# Patient Record
Sex: FEMALE | Race: BLACK OR AFRICAN AMERICAN | URBAN - METROPOLITAN AREA
[De-identification: names, ages, dates, MRNs, and addresses within clinical notes are randomized per-mention and may not be internally consistent; named-entity substitution may affect disease eponyms.]

---

## 2017-03-28 NOTE — PROGRESS NOTES
"   SUBJECTIVE:     CC: Mariaa Jacobson is an 21 year old woman who presents for preventive health visit.     Healthy Habits:    Do you get at least three servings of calcium containing foods daily (dairy, green leafy vegetables, etc.)? yes    Amount of exercise or daily activities, outside of work:   trys to-   Walks etc    Problems taking medications regularly not applicable    Medication side effects: No    Have you had an eye exam in the past two years? no    Do you see a dentist twice per year? Once per year    Do you have sleep apnea, excessive snoring or daytime drowsiness?no    Patient is requesting nexplanon removal.  She is planning to start a family.      She has a lesion above her upper lip that comes and goes, but never really goes away.  She states it will bleed at times.  It does not appear to act like a cold sore.       Chief Complaint   Patient presents with     Physical     Health Maintenance     height, weight, GC/Chlamydia, hpv, tetanus       Initial /78  Pulse 82  Ht 4' 11\" (1.499 m)  Wt 136 lb (61.7 kg)  BMI 27.47 kg/m2 Estimated body mass index is 27.47 kg/(m^2) as calculated from the following:    Height as of this encounter: 4' 11\" (1.499 m).    Weight as of this encounter: 136 lb (61.7 kg).  Medication Reconciliation: complete      Today's PHQ-2 Score:   PHQ-2 ( 1999 [employer]) 4/7/2017 2/10/2016   Q1: Little interest or pleasure in doing things 0 0   Q2: Feeling down, depressed or hopeless 0 0   PHQ-2 Score 0 0       Abuse: Current or Past(Physical, Sexual or Emotional)- No  Do you feel safe in your environment - Yes    Social History   Substance Use Topics     Smoking status: Never Smoker     Smokeless tobacco: Not on file     Alcohol use 0.0 oz/week     0 Standard drinks or equivalent per week      Comment: occasional     The patient does not drink >3 drinks per day nor >7 drinks per week.    No results for input(s): CHOL, HDL, LDL, TRIG, CHOLHDLRATIO, NHDL in the last 34053 " "hours.      Mammo Decision Support:  Mammogram not appropriate for this patient based on age.      Last 3 Pap Results:   PAP (no units)   Date Value   08/24/2015 NIL       ROS:  C: NEGATIVE for fever, chills, change in weight  I: NEGATIVE for worrisome rashes, moles or lesions  E: NEGATIVE for vision changes or irritation  ENT: NEGATIVE for ear, mouth and throat problems  R: NEGATIVE for significant cough or SOB  B: NEGATIVE for masses, tenderness or discharge  CV: NEGATIVE for chest pain, palpitations or peripheral edema  GI: NEGATIVE for nausea, abdominal pain, heartburn, or change in bowel habits  : NEGATIVE for unusual urinary or vaginal symptoms.  M: NEGATIVE for significant arthralgias or myalgia  N: NEGATIVE for weakness, dizziness or paresthesias  P: NEGATIVE for changes in mood or affect    BP Readings from Last 3 Encounters:   04/07/17 129/78   02/10/16 116/78   08/24/15 124/85    Wt Readings from Last 3 Encounters:   04/07/17 136 lb (61.7 kg)   02/10/16 133 lb 9.6 oz (60.6 kg)   08/24/15 132 lb 3.2 oz (60 kg)                  Patient Active Problem List   Diagnosis     CARDIOVASCULAR SCREENING; LDL GOAL LESS THAN 160     Past Surgical History:   Procedure Laterality Date     NO HISTORY OF SURGERY         Social History   Substance Use Topics     Smoking status: Never Smoker     Smokeless tobacco: Not on file     Alcohol use 0.0 oz/week     0 Standard drinks or equivalent per week      Comment: occasional     Family History   Problem Relation Age of Onset     DIABETES No family hx of      Hypertension No family hx of          OBJECTIVE:     /78  Pulse 82  Ht 4' 11\" (1.499 m)  Wt 136 lb (61.7 kg)  BMI 27.47 kg/m2  EXAM:    Gen: Alert and oriented times 3, no acute distress.  Well developed, well nourished, pleasant.    Neck: Supple, no masses.  No thyromegaly.  Breast: Symmetrical without lesions.  No dimpling, nipple discharge, or discrete masses.  No lymphadenopathy.  Chest:  Non labored.  " "Clear to auscultation bilaterally.    Heart: Regular, normal S1, S2.  No murmurs.   Abdomen: Soft, nontender, nondistended.  No hepatosplenomegaly.    :  Normal female external genitalia.  No lesions.  Urethral meatus normal.  Speculum exam reveals a normal vaginal vault, normal cervix.  No abnormal discharge.  Bimanual exam reveals a normal, mobile, nontender uterus.  No cervical motion tenderness.  Adnexa nontender with no palpable masses.    Extremities:  Nontender, no edema.    Pap obtained:  NO    ASSESSMENT/PLAN:         ICD-10-CM    1. Well female exam with routine gynecological exam Z01.419    2. Lip lesion K13.0 DERMATOLOGY REFERRAL       COUNSELING:   Reviewed preventive health counseling, as reflected in patient instructions       Family planning    She will schedule an appointment for Nexplanon removal at her convenience.      reports that she has never smoked. She does not have any smokeless tobacco history on file.    Estimated body mass index is 27.47 kg/(m^2) as calculated from the following:    Height as of this encounter: 4' 11\" (1.499 m).    Weight as of this encounter: 136 lb (61.7 kg).       Counseling Resources:  ATP IV Guidelines  Pooled Cohorts Equation Calculator  Breast Cancer Risk Calculator  FRAX Risk Assessment  ICSI Preventive Guidelines  Dietary Guidelines for Americans, 2010  USDA's MyPlate  ASA Prophylaxis  Lung CA Screening    Eda Costa MD  [facility]  "

## 2017-04-07 ENCOUNTER — OFFICE VISIT (OUTPATIENT)
Dept: OBGYN | Facility: CLINIC | Age: 22
End: 2017-04-07
Payer: COMMERCIAL

## 2017-04-07 VITALS
HEIGHT: 59 IN | HEART RATE: 82 BPM | DIASTOLIC BLOOD PRESSURE: 78 MMHG | BODY MASS INDEX: 27.42 KG/M2 | SYSTOLIC BLOOD PRESSURE: 129 MMHG | WEIGHT: 136 LBS

## 2017-04-07 DIAGNOSIS — K13.0 LIP LESION: ICD-10-CM

## 2017-04-07 DIAGNOSIS — Z01.419 WELL FEMALE EXAM WITH ROUTINE GYNECOLOGICAL EXAM: Primary | ICD-10-CM

## 2017-04-07 PROCEDURE — 99385 PREV VISIT NEW AGE 18-39: CPT | Performed by: OBSTETRICS & GYNECOLOGY

## 2017-04-07 ASSESSMENT — PAIN SCALES - GENERAL: PAINLEVEL: NO PAIN (0)

## 2017-04-14 ENCOUNTER — OFFICE VISIT (OUTPATIENT)
Dept: OBGYN | Facility: CLINIC | Age: 22
End: 2017-04-14
Payer: COMMERCIAL

## 2017-04-14 VITALS
BODY MASS INDEX: 28.07 KG/M2 | WEIGHT: 139 LBS | HEART RATE: 73 BPM | TEMPERATURE: 98.2 F | DIASTOLIC BLOOD PRESSURE: 71 MMHG | RESPIRATION RATE: 14 BRPM | OXYGEN SATURATION: 98 % | SYSTOLIC BLOOD PRESSURE: 115 MMHG

## 2017-04-14 DIAGNOSIS — Z30.46 NEXPLANON REMOVAL: Primary | ICD-10-CM

## 2017-04-14 DIAGNOSIS — Z30.011 ENCOUNTER FOR INITIAL PRESCRIPTION OF CONTRACEPTIVE PILLS: ICD-10-CM

## 2017-04-14 PROCEDURE — 11982 REMOVE DRUG IMPLANT DEVICE: CPT | Performed by: OBSTETRICS & GYNECOLOGY

## 2017-04-14 RX ORDER — NORGESTIMATE AND ETHINYL ESTRADIOL 0.25-0.035
1 KIT ORAL DAILY
Qty: 84 TABLET | Refills: 3 | Status: SHIPPED | OUTPATIENT
Start: 2017-04-14

## 2017-04-14 ASSESSMENT — PAIN SCALES - GENERAL: PAINLEVEL: NO PAIN (0)

## 2017-04-14 NOTE — PROGRESS NOTES
Mariaa Jacobson is a 21 year old female .  She was here today for Nexplanon removal. This was her second implant and she is planning pregnancy soon.  Patient medical, surgical, social, and family history reviewed and updated. ROS: 6 point ROS neg other than the symptoms noted above in the HPI.    She understands that she may become pregnant anytime after the Nexplanon is removed unless she uses another form of contraception.  She would like OCPs for now.  She will use back up contraception for the first month.  Instructions and precautions given.      Mariaa was counseled about removal. She voiced her understanding and informed consent was obtained.    /71 (BP Location: Left arm, Patient Position: Chair, Cuff Size: Adult Regular)  Pulse 73  Temp 98.2  F (36.8  C) (Oral)  Resp 14  Wt 139 lb (63 kg)  SpO2 98%  BMI 28.07 kg/m2   This is a well appearing female in no acute distress. Answers questions and maintains eye contact appropriately.       PROCEDURE NOTE:  After informed and written consent was obtained and a patient time-out conducted, the patient was situated on the exam table in supine position with her left arm rotated over her head and resting on the exam table. Nexplanon site was prepped with betadine and injected with 1% lidocaine with epi. After adequate anesthetization, a #11 blade was used to carry out a 3 mm incision near the top of the Implanon. A sterile curved hemostat was used to remove the Nexplanon. Steri strips and bandage were then applied. Patient tolerated the procedure well.

## 2017-04-14 NOTE — MR AVS SNAPSHOT
"              After Visit Summary   2017    Mariaa Jacobson    MRN: [id number]           Patient Information     Date Of Birth          1995        Visit Information        Provider Department      2017 9:00 AM Eda Costa MD [facility]        Today's Diagnoses     Nexplanon removal    -  1    Encounter for initial prescription of contraceptive pills           Follow-ups after your visit        Follow-up notes from your care team     Return if symptoms worsen or fail to improve.      Who to contact     If you have questions or need follow up information about today's clinic visit or your schedule please contact [facility] directly at [phone].  Normal or non-critical lab and imaging results will be communicated to you by MyChart, letter or phone within 4 business days after the clinic has received the results. If you do not hear from us within 7 days, please contact the clinic through MyChart or phone. If you have a critical or abnormal lab result, we will notify you by phone as soon as possible.  Submit refill requests through [employer] or call your pharmacy and they will forward the refill request to us. Please allow 3 business days for your refill to be completed.          Additional Information About Your Visit        MyChart Information     [employer]Cast lets you send messages to your doctor, view your test results, renew your prescriptions, schedule appointments and more. To sign up, go to [email or web address] . Click on \"Log in\" on the left side of the screen, which will take you to the Welcome page. Then click on \"Sign up Now\" on the right side of the page.     You will be asked to enter the access code listed below, as well as some personal information. Please follow the directions to create your username and password.     Your access code is: [email or web address]  Expires: 2017  9:09 AM     Your access code will  in 90 days. If you need help or a " new code, please call your [facility] clinic or [phone].        Care EveryWhere ID     This is your Care EveryWhere ID. This could be used by other organizations to access your [facility] medical records  [id number]        Your Vitals Were     Pulse Temperature Respirations Pulse Oximetry BMI (Body Mass Index)       73 98.2  F (36.8  C) (Oral) 14 98% 28.07 kg/m2        Blood Pressure from Last 3 Encounters:   04/14/17 115/71   04/07/17 129/78   02/10/16 116/78    Weight from Last 3 Encounters:   04/14/17 139 lb (63 kg)   04/07/17 136 lb (61.7 kg)   02/10/16 133 lb 9.6 oz (60.6 kg)              We Performed the Following     REMOVAL NON-BIODEGRADABLE DRUG DELIVERY IMPLANT          Today's Medication Changes          These changes are accurate as of: 4/14/17 10:30 AM.  If you have any questions, ask your nurse or doctor.               Start taking these medicines.        Dose/Directions    norgestimate-ethinyl estradiol 0.25-35 MG-MCG per tablet   Commonly known as:  ORTHO-CYCLEN, SPRINTEC   Used for:  Encounter for initial prescription of contraceptive pills   Started by:  Eda Costa MD        Dose:  1 tablet   Take 1 tablet by mouth daily   Quantity:  84 tablet   Refills:  3            Where to get your medicines      These medications were sent to [facility]  [id number] [place], [place] - [address]  [id number] [place] [address] [address], [facility] [address]     Phone:  [phone]     norgestimate-ethinyl estradiol 0.25-35 MG-MCG per tablet                Primary Care Provider Office Phone # Fax #    Mary Marin [phone]       [facility] [address]  [facility] [address]        Thank you!     Thank you for choosing [facility]  for your care. Our goal is always to provide you with excellent care. Hearing back from our patients is one way we can continue to improve our services. Please take a few minutes to complete the written survey that you may receive in the mail  after your visit with us. Thank you!             Your Updated Medication List - Protect others around you: Learn how to safely use, store and throw away your medicines at www.disposemymeds.org.          This list is accurate as of: 4/14/17 10:30 AM.  Always use your most recent med list.                   Brand Name Dispense Instructions for use    norgestimate-ethinyl estradiol 0.25-35 MG-MCG per tablet    ORTHO-CYCLEN, SPRINTEC    84 tablet    Take 1 tablet by mouth daily

## 2017-04-14 NOTE — NURSING NOTE
"Chief Complaint   Patient presents with     Minor Procedure     Nexplanon Removal     Health Maintenance       Initial /71 (BP Location: Left arm, Patient Position: Chair, Cuff Size: Adult Regular)  Pulse 73  Temp 98.2  F (36.8  C) (Oral)  Resp 14  Wt 139 lb (63 kg)  SpO2 98%  BMI 28.07 kg/m2 Estimated body mass index is 28.07 kg/(m^2) as calculated from the following:    Height as of 4/7/17: 4' 11\" (1.499 m).    Weight as of this encounter: 139 lb (63 kg).  BP completed using cuff size: regular    No obstetric history on file.    The following HM Due: Vaccinations: hpv, tetanus      The following patient reported/Care Every where data was sent to:  P ABSTRACT QUALITY INITIATIVES [[id number]]    Bethany Lazo CMA ([facility])          "

## 2018-10-29 ENCOUNTER — HEALTH MAINTENANCE LETTER (OUTPATIENT)
Age: 23
End: 2018-10-29